# Patient Record
Sex: FEMALE | Race: WHITE | NOT HISPANIC OR LATINO | Employment: OTHER | URBAN - METROPOLITAN AREA
[De-identification: names, ages, dates, MRNs, and addresses within clinical notes are randomized per-mention and may not be internally consistent; named-entity substitution may affect disease eponyms.]

---

## 2017-05-03 ENCOUNTER — ALLSCRIPTS OFFICE VISIT (OUTPATIENT)
Dept: OTHER | Facility: OTHER | Age: 77
End: 2017-05-03

## 2017-10-17 ENCOUNTER — ALLSCRIPTS OFFICE VISIT (OUTPATIENT)
Dept: OTHER | Facility: OTHER | Age: 77
End: 2017-10-17

## 2017-10-18 NOTE — PROGRESS NOTES
Assessment  Assessed    1  AF (paroxysmal atrial fibrillation) (427 31) (I48 0)   2  Chronic anticoagulation (V58 61) (Z79 01)   3  Hyperlipidemia (272 4) (E78 5)   4  Preoperative cardiovascular examination (V72 81) (Z01 810)    Discussion/Summary  Cardiology Discussion Summary Free Text Note Form [facility]:   Patient overall doing reasonably well from cardiovascular standpoint  Patient has no specific contraindication from cardiovascular standpoint further urological procedure  Patient presents moderate risk based on age and comorbidities  Importance of anticoagulation to prevent thromboembolic episodes discussed with the patient  Patient also understands long interruption of anticoagulation will put her at risk for thromboembolic events  Patient has been instructed to let us know if there is significant interruption of anticoagulation  Patient report any bleeding issues  An note for preoperative cardiovascular evaluation for urological procedure faxed to urology office Dr Peterson  Follow-up in 6 months follow-up with primary care physician  Goals and Barriers: The patient has the current Goals: Compliance with anticoagulation  The patent has the current Barriers: None  Patient's Capacity to Self-Care: Patient is able to Self-Care  Patient Education: Educational resources provided: Please see discussion summary  Medication SE Review and Pt Understands Tx: Possible side effects of new medications were reviewed with the patient/guardian today  Counseling Documentation With Imm: The patient was counseled regarding risk factor reductions,-- impressions,-- risks and benefits of treatment options,-- importance of compliance with treatment  Chief Complaint  Chief Complaint Chronic Condition [facility]: Patient is here today for follow up of chronic conditions described in HPI        History of Present Illness  Cardiology HPI Free Text Note Form [facility]: Patient presents for preoperative cardiovascular evaluation for urological procedure  Patient had hematuria and kidney stones  Patient had a stent placed  Patient is supposed to have this removed later this week  Patient at some time was off anticoagulation for 2 weeks  Patient is back on anticoagulation except holding it for procedure later this week  Patient denies any cat hematuria  Patient denies any other bleeding issues  Patient denies any chest pain or shortness of breath out of the ordinary  She states that she has been compliant with all her present medications  Review of Systems  Cardiology Female ROS:     Cardiac: as noted in HPI  Skin: No complaints of nonhealing sores or skin rash  Genitourinary: as noted in HPI   Psychological: No complaints of feeling depressed, anxiety, panic attacks, or difficulty concentrating  General: No complaints of trouble sleeping, lack of energy, fatigue, appetite changes, weight changes, fever, frequent infections, or night sweats  Respiratory: No complaints of shortness of breath, cough with sputum, or wheezing  HEENT: No complaints of serious problems, hearing problems, nose problems, throat problems, or snoring  Gastrointestinal: No complaints of liver problems, nausea, vomiting, heartburn, constipation, bloody stools, diarrhea, problems swallowing, adbominal pain, or rectal bleeding  Hematologic: No complaints of bleeding disorders, anemia, blood clots, or excessive brusing  Neurological: No complaints of numbness, tingling, dizziness, weakness, seizures, headaches, syncope or fainting, AM fatigue, daytime sleepiness, no witnessed apnea episodes  Musculoskeletal: No complaints of arthritis, back pain, or painfull swelling  ROS Reviewed:   ROS reviewed  Active Problems  Problems    1  AF (paroxysmal atrial fibrillation) (427 31) (I48 0)   2  A-fib (427 31) (I48 91)   3  Chronic anticoagulation (V58 61) (Z79 01)   4  Diabetes mellitus (250 00) (E11 9)   5  Dyspnea (786 09) (R06 00)   6   History of hyperlipidemia (V12 29) (Z86 39)   7  Hyperlipidemia (272 4) (E78 5)    Past Medical History  Problems    1  History of Anxiety (300 00) (F41 9)   2  History of atrial fibrillation (V12 59) (Z86 79)   3  History of esophageal reflux (V12 79) (Z87 19)   4  History of hyperlipidemia (V12 29) (Z86 39)   5  History of renal calculi (V13 01) (G65 338)  Active Problems And Past Medical History Reviewed: The active problems and past medical history were reviewed and updated today  Surgical History  Problems    1  History of Breast Surgery Lumpectomy   2  History of Oophorectomy   3  History of Tonsillectomy  Surgical History Reviewed: The surgical history was reviewed and updated today  Family History  Mother    1  Family history of coronary artery disease (V17 3) (Z82 49)  Father    2  Family history of coronary artery disease (V17 3) (Z82 49)  Brother    3  Family history of Diabetes (250 00) (E11 9)   4  Family history of coronary artery disease (V17 3) (Z82 49)  Family History Reviewed: The family history was reviewed and updated today  Social History  Problems    · Former smoker  Social History Reviewed: The social history was reviewed and updated today  Current Meds   1  Apriso 0 375 GM Oral Capsule Extended Release 24 Hour; TAKE 4 CAPSULES DAILY IN   THE MORNING; Therapy: 22Apr2015 to (Evaluate:16Apr2016); Last Rx:22Apr2015 Ordered   2  Cetirizine HCl - 10 MG Oral Tablet; TAKE 1 TABLET AT BEDTIME; Therapy: 22Apr2015 to (Evaluate:22May2015); Last Rx:22Apr2015 Ordered   3  [employer] Vitamin D 2000 UNIT CAPS; take 1 capsule daily; Therapy: 22Apr2015 to (Last Rx:22Apr2015) Ordered   4  Daily Multiple Vitamins Oral Tablet; TAKE 1 TABLET DAILY; Therapy: 22Apr2015 to (Evaluate:26Vft0793); Last Rx:22Apr2015 Ordered   5  MetFORMIN HCl - 500 MG Oral Tablet; TAKE 1 TABLET TWICE DAILY,  WITH MORNING   AND EVENING MEAL; Therapy: 22Apr2015 to (Evaluate:16Apr2016); Last Rx:95Bng1957 Ordered   6  Metoprolol Tartrate 50 MG Oral Tablet; TAKE 1 TABLET TWICE DAILY; Therapy: 53CER0114 to (Evaluate:32Bse8049) Recorded   7  Potassium Citrate ER 10 MEQ (1080 MG) Oral Tablet Extended Release; TAKE 1 TABLET   DAILY; Therapy: 65LRN5126 to Recorded   8  Simvastatin 20 MG Oral Tablet; TAKE 1 TABLET DAILY AT BEDTIME; Therapy: 54Xqy3831 to (Evaluate:10Fws1356); Last Rx:26Eja6159 Ordered   9  Vitamin C Oral Tablet Chewable; Take 1 tablet daily; Therapy: 05Jpi9736 to (Last Rx:22Apr2015) Ordered   10  Xarelto 20 MG Oral Tablet; take 1 tablet by mouth daily; Therapy: 18Phz2322 to (Evaluate:05Yoa4114)  Requested for: 38JVK4669; Last    HX:15XWH2623 Ordered   11  Zolpidem Tartrate 5 MG Oral Tablet; TAKE 1 TABLET AT  BEDTIME AS NEEDED FOR    INSOMNIA; Therapy: 80Ypk8518 to (Evaluate:07Zpi5380); Last Rx:22Apr2015 Ordered  Medication List Reviewed: The medication list was reviewed and updated today  Allergies  Medication    1  Lipitor TABS   2  mirtazapine   3  Penicillins   4  sertraline   5  Sulfa Drugs   6  Symbicort AERO    Vitals  Vital Signs    Recorded: 58TER6796 09:41AM   Heart Rate 68   Systolic 120   Diastolic 74   Height 5 ft 2 in   Weight 145 lb    BMI Calculated 26 52   BSA Calculated 1 67   O2 Saturation 96     Physical Exam    Constitutional   General appearance: No acute distress, well appearing and well nourished  Eyes   Conjunctiva and Sclera examination: Conjunctiva pink, sclera anicteric  Ears, Nose, Mouth, and Throat - Oropharynx: Clear, nares are clear, mucous membranes are moist    Neck   Neck and thyroid: Normal, supple, trachea midline, no thyromegaly  Pulmonary   Respiratory effort: No increased work of breathing or signs of respiratory distress  Auscultation of lungs: Clear to auscultation, no rales, no rhonchi, no wheezing, good air movement  Cardiovascular   Auscultation of heart: Normal rate and rhythm, normal S1 and S2, no murmurs      Carotid pulses: Normal, 2+ bilaterally  Peripheral vascular exam: Normal pulses throughout, no tenderness, erythema or swelling  Pedal pulses: Normal, 2+ bilaterally  Examination of extremities for edema and/or varicosities: Normal     Abdomen   Abdomen: Non-tender and no distention  Liver and spleen: No hepatomegaly or splenomegaly  Musculoskeletal Gait and station: Normal gait  -- Digits and nails: Normal without clubbing or cyanosis  -- Inspection/palpation of joints, bones, and muscles: Normal, ROM normal     Skin - Skin and subcutaneous tissue: Normal without rashes or lesions  Skin is warm and well perfused, normal turgor  Neurologic - Cranial nerves: II - XII intact  -- Speech: Normal     Psychiatric - Orientation to person, place, and time: Normal -- Mood and affect: Normal       Signatures   Electronically signed by : SYDNEE Monroe ; Oct 17 2017  6:52PM EST                       (Author)

## 2018-01-13 VITALS
HEART RATE: 66 BPM | SYSTOLIC BLOOD PRESSURE: 112 MMHG | WEIGHT: 154 LBS | BODY MASS INDEX: 28.34 KG/M2 | DIASTOLIC BLOOD PRESSURE: 66 MMHG | HEIGHT: 62 IN | OXYGEN SATURATION: 95 %

## 2018-01-14 VITALS
HEART RATE: 68 BPM | WEIGHT: 145 LBS | DIASTOLIC BLOOD PRESSURE: 74 MMHG | OXYGEN SATURATION: 96 % | BODY MASS INDEX: 26.68 KG/M2 | HEIGHT: 62 IN | SYSTOLIC BLOOD PRESSURE: 120 MMHG

## 2018-01-15 NOTE — MISCELLANEOUS
This is to state that this patient has no specific contraindication from cardiovascular standpoint to proceed with urological surgery  Patient is holding Xarelto for 4 days prior to the procedure  Patient presents  moderate risk based on age and comorbidities  Patient should restart anticoagulation as soon as possible after the procedure when okay from a urological standpoint  If you have any specific questions however, please do not hesitate to contact me in  person  Electronically signed by:Brandi SHARP    Oct 17 2017 10:04AM EST

## 2018-05-17 RX ORDER — METOPROLOL TARTRATE 50 MG/1
1 TABLET, FILM COATED ORAL 2 TIMES DAILY
COMMUNITY
Start: 2017-05-03

## 2018-05-17 RX ORDER — MESALAMINE 375 MG/1
4 CAPSULE, EXTENDED RELEASE ORAL DAILY
COMMUNITY
Start: 2015-04-22

## 2018-05-17 RX ORDER — ZOLPIDEM TARTRATE 5 MG/1
1 TABLET ORAL
COMMUNITY
Start: 2015-04-22

## 2018-05-17 RX ORDER — SIMVASTATIN 20 MG
1 TABLET ORAL
COMMUNITY
Start: 2015-04-22

## 2018-05-17 RX ORDER — CETIRIZINE HYDROCHLORIDE 10 MG/1
1 TABLET ORAL
COMMUNITY
Start: 2015-04-22

## 2018-05-17 RX ORDER — POTASSIUM CITRATE 10 MEQ/1
1 TABLET, EXTENDED RELEASE ORAL DAILY
COMMUNITY
Start: 2017-05-03

## 2018-05-21 ENCOUNTER — OFFICE VISIT (OUTPATIENT)
Dept: CARDIOLOGY CLINIC | Facility: CLINIC | Age: 78
End: 2018-05-21
Payer: MEDICARE

## 2018-05-21 VITALS
OXYGEN SATURATION: 95 % | WEIGHT: 146 LBS | HEIGHT: 62 IN | DIASTOLIC BLOOD PRESSURE: 64 MMHG | BODY MASS INDEX: 26.87 KG/M2 | HEART RATE: 65 BPM | SYSTOLIC BLOOD PRESSURE: 120 MMHG

## 2018-05-21 DIAGNOSIS — Z79.01 CHRONIC ANTICOAGULATION: ICD-10-CM

## 2018-05-21 DIAGNOSIS — E78.2 HYPERLIPEMIA, MIXED: ICD-10-CM

## 2018-05-21 DIAGNOSIS — I48.0 PAROXYSMAL A-FIB (HCC): Primary | ICD-10-CM

## 2018-05-21 DIAGNOSIS — I10 HYPERTENSION, ESSENTIAL: ICD-10-CM

## 2018-05-21 PROCEDURE — 99214 OFFICE O/P EST MOD 30 MIN: CPT | Performed by: INTERNAL MEDICINE

## 2018-05-21 RX ORDER — HYDROCHLOROTHIAZIDE 25 MG/1
25 TABLET ORAL DAILY
COMMUNITY

## 2018-05-21 NOTE — PROGRESS NOTES
[facility] [place]  [address]  [place] [facility]  [address]  Cardiology Follow Up    Maria M [facility]  1940  [id number]      [facility]  [facility]     2  Chronic anticoagulation     3  Hyperlipemia, mixed     4  Hypertension, essential         Chief Complaint   Patient presents with    Follow-up       Interval History:   Patient presents for follow-up visit  Patient denies any history of chest pain shortness of breath  Patient denies any history of leg edema or orthopnea PND  No history of presyncope syncope  Patient states compliance with the present list of medications  Patient denies any bleeding issues  Patient is on anticoagulation for paroxysmal atrial fibrillation  Patient had kidney stones which were treated last year  No further recurrence  Patient Active Problem List   Diagnosis    Paroxysmal A-fib (HCC)    Chronic anticoagulation    Hyperlipemia, mixed    Hypertension, essential     Past Medical History:   Diagnosis Date    [facility]     Anxiety     Esophageal reflux     Hyperlipidemia     Renal calculi      Social History     Social History    Marital status: Single     Spouse name: N/A    Number of children: N/A    Years of education: N/A     Occupational History    Not on file       Social History Main Topics    Smoking status: Former Smoker    Smokeless tobacco: Never Used    Alcohol use No    Drug use: No    Sexual activity: Not on file     Other Topics Concern    Not on file     Social History Narrative    No narrative on file      Family History   Problem Relation Age of Onset    Coronary artery disease Mother     Coronary artery disease Father     Coronary artery disease Brother     Diabetes Brother      Past Surgical History:   Procedure Laterality Date    BREAST LUMPECTOMY      TONSILLECTOMY      TOTAL ABDOMINAL HYSTERECTOMY W/ BILATERAL SALPINGOOPHORECTOMY         Current Outpatient Prescriptions:     Bioflavonoid Products (VITAMIN C) CHEW, Chew 1 tablet daily, Disp: , Rfl:     cetirizine (ZyrTEC) 10 mg tablet, Take 1 tablet by mouth, Disp: , Rfl:     Cholecalciferol ([employer] VITAMIN D) 2000 units CAPS, Take 1 capsule by mouth daily, Disp: , Rfl:     hydrochlorothiazide (HYDRODIURIL) 25 mg tablet, Take 25 mg by mouth 2 (two) times a day, Disp: , Rfl:     mesalamine (APRISO) 0 375 g 24 hr capsule, Take 4 capsules by mouth Daily, Disp: , Rfl:     metFORMIN (GLUCOPHAGE) 500 mg tablet, Take 1 tablet by mouth Twice daily, Disp: , Rfl:     metoprolol tartrate (LOPRESSOR) 50 mg tablet, Take 1 tablet by mouth 2 (two) times a day, Disp: , Rfl:     potassium citrate (UROCIT-K) 10 mEq, Take 1 tablet by mouth daily, Disp: , Rfl:     rivaroxaban (XARELTO) 20 mg tablet, Take 1 tablet by mouth daily, Disp: , Rfl:     simvastatin (ZOCOR) 20 mg tablet, Take 1 tablet by mouth, Disp: , Rfl:     sitaGLIPtin (JANUVIA) 50 mg tablet, Take 50 mg by mouth daily, Disp: , Rfl:     zolpidem (AMBIEN) 5 mg tablet, Take 1 tablet by mouth, Disp: , Rfl:   Allergies   Allergen Reactions    Atorvastatin     Hydromorphone      Other reaction(s): dizziness    Mirtazapine     Penicillins     Sertraline     Sulfa Antibiotics     Symbicort  [Budesonide-Formoterol Fumarate]        Labs:  No visits with results within 2 Month(s) from this visit  Latest known visit with results is:   No results found for any previous visit  Imaging: No results found      Review of Systems:  Review of Systems   REVIEW OF SYSTEMS:  Constitutional:  Denies fever or chills   Eyes:  Denies change in visual acuity   HENT:  Denies nasal congestion or sore throat   Respiratory:  Denies cough or shortness of breath   Cardiovascular:  Denies chest pain or edema   GI:  Denies abdominal pain, nausea, vomiting, bloody stools or diarrhea   :  Denies dysuria, frequency, difficulty in micturition and nocturia  Musculoskeletal:  Denies back pain or joint pain   Neurologic:  Denies headache, focal weakness or sensory changes   Endocrine:  Denies polyuria or polydipsia   Lymphatic:  Denies swollen glands   Psychiatric:  Denies depression or anxiety     Physical Exam:    /64 (BP Location: Right arm, Patient Position: Sitting, Cuff Size: Adult)   Pulse 65   Ht 5' 2" (1 575 m)   Wt 66 2 kg (146 lb)   SpO2 95%   BMI 26 70 kg/m²     Physical Exam   PHYSICAL EXAM:  General:  Patient is not in acute distress   Head: Normocephalic, Atraumatic  HEENT:  Both pupils normal-size atraumatic, normocephalic, nonicteric  Neck:  JVP not raised  Trachea central  No carotid bruit  Respiratory:  normal breath sounds no crackles  no rhonchi  Cardiovascular:  Regular rate and rhythm no S3 no murmurs  GI:  Abdomen soft nontender  No organomegaly  Lymphatic:  No cervical or inguinal lymphadenopathy  Neurologic:  Patient is awake alert, oriented   Grossly nonfocal    Discussion/Summary:  Patient with multiple medical problems who seems to be doing reasonably well from cardiac standpoint  Previous studies reviewed with patient  Medications reviewed and possible side effects discussed  concepts of cardiovascular disease , signs and symptoms of heart disease  Dietary and risk factor modification reinforced  All questions answered  Safety measures reviewed  Patient advised to report any problems prompting medical attention  Patient understands the risks and benefits of anticoagulation to prevent thromboembolic risk from paroxysmal atrial fibrillation  Patient report any bleeding issues  Patient will continue present medications which were reviewed with her  Importance of salt restriction reinforced  Patient had a few questions which were answered  Follow-up with primary care physician  Followup in 6 months

## 2018-11-12 ENCOUNTER — OFFICE VISIT (OUTPATIENT)
Dept: CARDIOLOGY CLINIC | Facility: CLINIC | Age: 78
End: 2018-11-12
Payer: MEDICARE

## 2018-11-12 VITALS
HEART RATE: 71 BPM | HEIGHT: 62 IN | WEIGHT: 137.2 LBS | DIASTOLIC BLOOD PRESSURE: 78 MMHG | OXYGEN SATURATION: 96 % | BODY MASS INDEX: 25.25 KG/M2 | SYSTOLIC BLOOD PRESSURE: 130 MMHG

## 2018-11-12 DIAGNOSIS — E78.2 HYPERLIPEMIA, MIXED: ICD-10-CM

## 2018-11-12 DIAGNOSIS — Z79.01 CHRONIC ANTICOAGULATION: ICD-10-CM

## 2018-11-12 DIAGNOSIS — I10 HYPERTENSION, ESSENTIAL: ICD-10-CM

## 2018-11-12 DIAGNOSIS — I48.0 PAROXYSMAL A-FIB (HCC): Primary | ICD-10-CM

## 2018-11-12 PROCEDURE — 99214 OFFICE O/P EST MOD 30 MIN: CPT | Performed by: INTERNAL MEDICINE

## 2018-11-12 RX ORDER — MULTIVITAMIN
1 CAPSULE ORAL DAILY
COMMUNITY

## 2018-11-12 NOTE — PROGRESS NOTES
[facility]  [address]  [place] [facility]  [address]  Cardiology Follow Up    Viji Murillo  1940  [id number]      1  [facility] ([facility]     2  Hypertension, essential     3  Chronic anticoagulation     4  Hyperlipemia, mixed         Chief Complaint   Patient presents with    Follow-up       Interval History:  Patient presents for follow-up visit  Patient denies any history of chest pain shortness of breath  Patient denies any history of leg edema or orthopnea PND  No history of presyncope syncope  Patient states compliance with the present list of medications  Patient denies any bleeding issues  Patient is on anticoagulation for atrial fibrillation  Patient Active Problem List   Diagnosis    Paroxysmal A-fib (HCC)    Chronic anticoagulation    Hyperlipemia, mixed    Hypertension, essential     Past Medical History:   Diagnosis Date    [facility] )     Anxiety     Esophageal reflux     Hyperlipidemia     Renal calculi      Social History     Social History    Marital status: Single     Spouse name: N/A    Number of children: N/A    Years of education: N/A     Occupational History    Not on file       Social History Main Topics    Smoking status: Former Smoker    Smokeless tobacco: Never Used    Alcohol use No    Drug use: No    Sexual activity: Not on file     Other Topics Concern    Not on file     Social History Narrative    No narrative on file      Family History   Problem Relation Age of Onset    Coronary artery disease Mother     Coronary artery disease Father     Coronary artery disease Brother     Diabetes Brother      Past Surgical History:   Procedure Laterality Date    BREAST LUMPECTOMY      TONSILLECTOMY      TOTAL ABDOMINAL HYSTERECTOMY W/ BILATERAL SALPINGOOPHORECTOMY         Current Outpatient Prescriptions:     Bioflavonoid Products (VITAMIN C) CHEW, Chew 1 tablet daily, Disp: , Rfl:     cetirizine (ZyrTEC) 10 mg tablet, Take 1 tablet by mouth, Disp: , Rfl:     Cholecalciferol ([employer] VITAMIN D) 2000 units CAPS, Take by mouth 1/2 tablet daily , Disp: , Rfl:     hydrochlorothiazide (HYDRODIURIL) 25 mg tablet, Take 25 mg by mouth daily  , Disp: , Rfl:     mesalamine (APRISO) 0 375 g 24 hr capsule, Take 4 capsules by mouth Daily, Disp: , Rfl:     metFORMIN (GLUCOPHAGE) 500 mg tablet, Take 1 tablet by mouth Twice daily, Disp: , Rfl:     metoprolol tartrate (LOPRESSOR) 50 mg tablet, Take 1 tablet by mouth 2 (two) times a day, Disp: , Rfl:     Multiple Vitamin (MULTIVITAMIN) capsule, Take 1 capsule by mouth daily, Disp: , Rfl:     potassium citrate (UROCIT-K) 10 mEq, Take 1 tablet by mouth daily, Disp: , Rfl:     rivaroxaban (XARELTO) 20 mg tablet, Take 1 tablet by mouth daily, Disp: , Rfl:     simvastatin (ZOCOR) 20 mg tablet, Take 1 tablet by mouth, Disp: , Rfl:     sitaGLIPtin (JANUVIA) 50 mg tablet, Take 50 mg by mouth daily, Disp: , Rfl:     zolpidem (AMBIEN) 5 mg tablet, Take 1 tablet by mouth daily at bedtime as needed  , Disp: , Rfl:   Allergies   Allergen Reactions    Atorvastatin     Hydromorphone      Other reaction(s): dizziness    Mirtazapine     Penicillins     Sertraline     Sulfa Antibiotics     Symbicort  [Budesonide-Formoterol Fumarate]        Labs:  No visits with results within 2 Month(s) from this visit  Latest known visit with results is:   No results found for any previous visit  Imaging: No results found      Review of Systems:  Review of Systems   REVIEW OF SYSTEMS:  Constitutional:  Denies fever or chills   Eyes:  Denies change in visual acuity   HENT:  Denies nasal congestion or sore throat   Respiratory:  Denies cough or shortness of breath   Cardiovascular:  Denies chest pain or edema   GI:  Denies abdominal pain, nausea, vomiting, bloody stools or diarrhea   :  Denies dysuria, frequency, difficulty in micturition and nocturia  Musculoskeletal:  Denies back pain or joint pain   Neurologic: Denies headache, focal weakness or sensory changes   Endocrine:  Denies polyuria or polydipsia   Lymphatic:  Denies swollen glands   Psychiatric:  Denies depression or anxiety     Physical Exam:    /78   Pulse 71   Ht 5' 2" (1 575 m)   Wt 62 2 kg (137 lb 3 2 oz)   SpO2 96%   BMI 25 09 kg/m²     Physical Exam   PHYSICAL EXAM:  General:  Patient is not in acute distress   Head: Normocephalic, Atraumatic  HEENT:  Both pupils normal-size atraumatic, normocephalic, nonicteric  Neck:  JVP not raised  Trachea central  No carotid bruit  Respiratory:  normal breath sounds no crackles  no rhonchi  Cardiovascular:  Regular rate and rhythm no S3 no murmurs  GI:  Abdomen soft nontender  No organomegaly  Lymphatic:  No cervical or inguinal lymphadenopathy  Neurologic:  Patient is awake alert, oriented   Grossly nonfocal    Discussion/Summary:  Patient with multiple medical problems who seems to be doing reasonably well from cardiac standpoint  Previous studies reviewed with patient  Medications reviewed and possible side effects discussed  concepts of cardiovascular disease , signs and symptoms of heart disease  Dietary and risk factor modification reinforced  All questions answered  Safety measures reviewed  Patient advised to report any problems prompting medical attention  Patient understands the risks and benefits of anticoagulation to prevent thromboembolic risk from atrial fibrillation  Patient report any bleeding issues  Blood pressures are well controlled  Importance of salt restriction reinforced with the patient  Medications were reviewed  Follow-up with primary care physician  Echocardiogram will be done to evaluate ejection fraction valves and look for evidence of pulmonary hypertension  Patient has not had an echocardiogram in a few years  Follow-up in 6 months or earlier as needed

## 2018-12-10 ENCOUNTER — HOSPITAL ENCOUNTER (OUTPATIENT)
Dept: NON INVASIVE DIAGNOSTICS | Facility: CLINIC | Age: 78
Discharge: HOME | End: 2018-12-10
Payer: MEDICARE

## 2018-12-10 DIAGNOSIS — I48.0 PAROXYSMAL A-FIB (HCC): ICD-10-CM

## 2018-12-10 PROCEDURE — 93306 TTE W/DOPPLER COMPLETE: CPT | Performed by: INTERNAL MEDICINE

## 2018-12-10 PROCEDURE — 93306 TTE W/DOPPLER COMPLETE: CPT

## 2019-05-08 ENCOUNTER — OFFICE VISIT (OUTPATIENT)
Dept: CARDIOLOGY CLINIC | Facility: CLINIC | Age: 79
End: 2019-05-08
Payer: MEDICARE

## 2019-05-08 VITALS
WEIGHT: 133.4 LBS | HEIGHT: 62 IN | OXYGEN SATURATION: 97 % | SYSTOLIC BLOOD PRESSURE: 112 MMHG | DIASTOLIC BLOOD PRESSURE: 64 MMHG | HEART RATE: 66 BPM | BODY MASS INDEX: 24.55 KG/M2

## 2019-05-08 DIAGNOSIS — I10 HYPERTENSION, ESSENTIAL: ICD-10-CM

## 2019-05-08 DIAGNOSIS — E78.2 HYPERLIPEMIA, MIXED: ICD-10-CM

## 2019-05-08 DIAGNOSIS — Z79.01 CHRONIC ANTICOAGULATION: ICD-10-CM

## 2019-05-08 DIAGNOSIS — I48.0 PAROXYSMAL A-FIB (HCC): Primary | ICD-10-CM

## 2019-05-08 PROCEDURE — 99213 OFFICE O/P EST LOW 20 MIN: CPT | Performed by: INTERNAL MEDICINE

## 2023-10-03 ENCOUNTER — APPOINTMENT (EMERGENCY)
Dept: RADIOLOGY | Facility: HOSPITAL | Age: 83
End: 2023-10-03
Payer: MEDICARE

## 2023-10-03 ENCOUNTER — APPOINTMENT (EMERGENCY)
Dept: CT IMAGING | Facility: HOSPITAL | Age: 83
End: 2023-10-03
Payer: MEDICARE

## 2023-10-03 ENCOUNTER — HOSPITAL ENCOUNTER (EMERGENCY)
Facility: HOSPITAL | Age: 83
Discharge: HOME | End: 2023-10-03
Attending: EMERGENCY MEDICINE
Payer: MEDICARE

## 2023-10-03 VITALS
OXYGEN SATURATION: 96 % | RESPIRATION RATE: 19 BRPM | DIASTOLIC BLOOD PRESSURE: 60 MMHG | HEART RATE: 63 BPM | SYSTOLIC BLOOD PRESSURE: 127 MMHG | TEMPERATURE: 98 F

## 2023-10-03 DIAGNOSIS — R93.89 ABNORMAL CT SCAN: ICD-10-CM

## 2023-10-03 DIAGNOSIS — R51.9 HEADACHE: ICD-10-CM

## 2023-10-03 DIAGNOSIS — R42 DIZZINESS: Primary | ICD-10-CM

## 2023-10-03 LAB
ALBUMIN SERPL BCP-MCNC: 3.8 G/DL (ref 3.5–5)
ALP SERPL-CCNC: 50 U/L (ref 34–104)
ALT SERPL W P-5'-P-CCNC: 13 U/L (ref 7–52)
ANION GAP SERPL CALCULATED.3IONS-SCNC: 5 MMOL/L
APTT PPP: 32 SECONDS (ref 23–37)
AST SERPL W P-5'-P-CCNC: 15 U/L (ref 13–39)
ATRIAL RATE: 56 BPM
BACTERIA UR QL AUTO: ABNORMAL /HPF
BASOPHILS # BLD AUTO: 0.03 THOUSANDS/ÂΜL (ref 0–0.1)
BASOPHILS NFR BLD AUTO: 1 % (ref 0–1)
BILIRUB DIRECT SERPL-MCNC: 0.1 MG/DL (ref 0–0.2)
BILIRUB SERPL-MCNC: 0.41 MG/DL (ref 0.2–1)
BILIRUB UR QL STRIP: NEGATIVE
BUDDING YEAST: PRESENT
BUN SERPL-MCNC: 21 MG/DL (ref 5–25)
CALCIUM SERPL-MCNC: 10.5 MG/DL (ref 8.4–10.2)
CARDIAC TROPONIN I PNL SERPL HS: 2 NG/L
CHLORIDE SERPL-SCNC: 104 MMOL/L (ref 96–108)
CLARITY UR: ABNORMAL
CO2 SERPL-SCNC: 30 MMOL/L (ref 21–32)
COLOR UR: YELLOW
CREAT SERPL-MCNC: 1.02 MG/DL (ref 0.6–1.3)
EOSINOPHIL # BLD AUTO: 0.06 THOUSAND/ÂΜL (ref 0–0.61)
EOSINOPHIL NFR BLD AUTO: 1 % (ref 0–6)
ERYTHROCYTE [DISTWIDTH] IN BLOOD BY AUTOMATED COUNT: 12.7 % (ref 11.6–15.1)
GFR SERPL CREATININE-BSD FRML MDRD: 50 ML/MIN/1.73SQ M
GLUCOSE SERPL-MCNC: 79 MG/DL (ref 65–140)
GLUCOSE UR STRIP-MCNC: NEGATIVE MG/DL
HCT VFR BLD AUTO: 38.8 % (ref 34.8–46.1)
HGB BLD-MCNC: 12.5 G/DL (ref 11.5–15.4)
HGB UR QL STRIP.AUTO: NEGATIVE
IMM GRANULOCYTES # BLD AUTO: 0.02 THOUSAND/UL (ref 0–0.2)
IMM GRANULOCYTES NFR BLD AUTO: 0 % (ref 0–2)
INR PPP: 1.08 (ref 0.84–1.19)
KETONES UR STRIP-MCNC: NEGATIVE MG/DL
LACTATE SERPL-SCNC: 1.5 MMOL/L (ref 0.5–2)
LEUKOCYTE ESTERASE UR QL STRIP: ABNORMAL
LYMPHOCYTES # BLD AUTO: 1.92 THOUSANDS/ÂΜL (ref 0.6–4.47)
LYMPHOCYTES NFR BLD AUTO: 31 % (ref 14–44)
MCH RBC QN AUTO: 28.2 PG (ref 26.8–34.3)
MCHC RBC AUTO-ENTMCNC: 32.2 G/DL (ref 31.4–37.4)
MCV RBC AUTO: 88 FL (ref 82–98)
MONOCYTES # BLD AUTO: 0.49 THOUSAND/ÂΜL (ref 0.17–1.22)
MONOCYTES NFR BLD AUTO: 8 % (ref 4–12)
NEUTROPHILS # BLD AUTO: 3.6 THOUSANDS/ÂΜL (ref 1.85–7.62)
NEUTS SEG NFR BLD AUTO: 59 % (ref 43–75)
NITRITE UR QL STRIP: NEGATIVE
NON-SQ EPI CELLS URNS QL MICRO: ABNORMAL /HPF
NRBC BLD AUTO-RTO: 0 /100 WBCS
P AXIS: 86 DEGREES
PH UR STRIP.AUTO: 7.5 [PH]
PLATELET # BLD AUTO: 166 THOUSANDS/UL (ref 149–390)
PMV BLD AUTO: 10 FL (ref 8.9–12.7)
POTASSIUM SERPL-SCNC: 4.3 MMOL/L (ref 3.5–5.3)
PR INTERVAL: 220 MS
PROT SERPL-MCNC: 5.9 G/DL (ref 6.4–8.4)
PROT UR STRIP-MCNC: NEGATIVE MG/DL
PROTHROMBIN TIME: 14.6 SECONDS (ref 11.6–14.5)
QRS AXIS: 19 DEGREES
QRSD INTERVAL: 82 MS
QT INTERVAL: 398 MS
QTC INTERVAL: 384 MS
RBC # BLD AUTO: 4.43 MILLION/UL (ref 3.81–5.12)
RBC #/AREA URNS AUTO: ABNORMAL /HPF
SODIUM SERPL-SCNC: 139 MMOL/L (ref 135–147)
SP GR UR STRIP.AUTO: 1.02 (ref 1–1.03)
T WAVE AXIS: 28 DEGREES
UROBILINOGEN UR STRIP-ACNC: <2 MG/DL
VENTRICULAR RATE: 56 BPM
WBC # BLD AUTO: 6.12 THOUSAND/UL (ref 4.31–10.16)
WBC #/AREA URNS AUTO: ABNORMAL /HPF

## 2023-10-03 PROCEDURE — 84484 ASSAY OF TROPONIN QUANT: CPT | Performed by: EMERGENCY MEDICINE

## 2023-10-03 PROCEDURE — 70450 CT HEAD/BRAIN W/O DYE: CPT

## 2023-10-03 PROCEDURE — 99285 EMERGENCY DEPT VISIT HI MDM: CPT | Performed by: EMERGENCY MEDICINE

## 2023-10-03 PROCEDURE — 85730 THROMBOPLASTIN TIME PARTIAL: CPT | Performed by: EMERGENCY MEDICINE

## 2023-10-03 PROCEDURE — 93010 ELECTROCARDIOGRAM REPORT: CPT | Performed by: INTERNAL MEDICINE

## 2023-10-03 PROCEDURE — 93005 ELECTROCARDIOGRAM TRACING: CPT

## 2023-10-03 PROCEDURE — 36415 COLL VENOUS BLD VENIPUNCTURE: CPT | Performed by: EMERGENCY MEDICINE

## 2023-10-03 PROCEDURE — 80048 BASIC METABOLIC PNL TOTAL CA: CPT | Performed by: EMERGENCY MEDICINE

## 2023-10-03 PROCEDURE — 99284 EMERGENCY DEPT VISIT MOD MDM: CPT

## 2023-10-03 PROCEDURE — 87040 BLOOD CULTURE FOR BACTERIA: CPT | Performed by: EMERGENCY MEDICINE

## 2023-10-03 PROCEDURE — 85025 COMPLETE CBC W/AUTO DIFF WBC: CPT | Performed by: EMERGENCY MEDICINE

## 2023-10-03 PROCEDURE — 81001 URINALYSIS AUTO W/SCOPE: CPT | Performed by: EMERGENCY MEDICINE

## 2023-10-03 PROCEDURE — 85610 PROTHROMBIN TIME: CPT | Performed by: EMERGENCY MEDICINE

## 2023-10-03 PROCEDURE — 80076 HEPATIC FUNCTION PANEL: CPT | Performed by: EMERGENCY MEDICINE

## 2023-10-03 PROCEDURE — 96360 HYDRATION IV INFUSION INIT: CPT

## 2023-10-03 PROCEDURE — 83605 ASSAY OF LACTIC ACID: CPT | Performed by: EMERGENCY MEDICINE

## 2023-10-03 PROCEDURE — 71046 X-RAY EXAM CHEST 2 VIEWS: CPT

## 2023-10-03 RX ORDER — TRAZODONE HYDROCHLORIDE 50 MG/1
50 TABLET ORAL
COMMUNITY

## 2023-10-03 RX ORDER — MULTIVIT WITH MINERALS/LUTEIN
500 TABLET ORAL DAILY
COMMUNITY

## 2023-10-03 RX ADMIN — SODIUM CHLORIDE 1000 ML: 0.9 INJECTION, SOLUTION INTRAVENOUS at 14:34

## 2023-10-07 NOTE — ED PROVIDER NOTES
History  Chief Complaint   Patient presents with   • Dizziness     C/o of dizziness, HA , and fatigue that lasted x10 minutes earlier today and has now resolved. Recent episode of same x1 week ago. A&O x4. GCS 15.     77 yo female with a 10 minute episode of dizziness, headache and generalized weakness that happened earlier today. Currently asymptomatic. Had a similar episode one week ago. Prior to Admission Medications   Prescriptions Last Dose Informant Patient Reported? Taking?    Bioflavonoid Products (VITAMIN C) CHEW  Self Yes No   Sig: Chew 1 tablet daily   Cholecalciferol ([employer] VITAMIN D) 2000 units CAPS  Self Yes Yes   Sig: Take by mouth 1/2 tablet daily    Multiple Vitamin (MULTIVITAMIN) capsule  Self Yes Yes   Sig: Take 1 capsule by mouth daily   ascorbic acid (VITAMIN C) 250 mg tablet   Yes Yes   Sig: Take 500 mg by mouth daily   cetirizine (ZyrTEC) 10 mg tablet Not Taking Self Yes No   Sig: Take 1 tablet by mouth   Patient not taking: Reported on 10/3/2023   hydrochlorothiazide (HYDRODIURIL) 25 mg tablet  Self Yes No   Sig: Take 25 mg by mouth daily     mesalamine (APRISO) 0.375 g 24 hr capsule  Self Yes Yes   Sig: Take 4 capsules by mouth Daily   metFORMIN (GLUCOPHAGE) 500 mg tablet  Self Yes Yes   Sig: Take 1 tablet by mouth Twice daily   metoprolol tartrate (LOPRESSOR) 50 mg tablet  Self Yes Yes   Sig: Take 1 tablet by mouth 2 (two) times a day   potassium citrate (UROCIT-K) 10 mEq  Self Yes Yes   Sig: Take 1 tablet by mouth daily   rivaroxaban (XARELTO) 20 mg tablet  Self Yes Yes   Sig: Take 1 tablet by mouth daily   simvastatin (ZOCOR) 20 mg tablet  Self Yes Yes   Sig: Take 1 tablet by mouth   sitaGLIPtin (JANUVIA) 50 mg tablet  Self Yes Yes   Sig: Take 50 mg by mouth daily   traZODone (DESYREL) 50 mg tablet   Yes Yes   Sig: Take 50 mg by mouth daily at bedtime   zolpidem (AMBIEN) 5 mg tablet  Self Yes Yes   Sig: Take 1 tablet by mouth daily at bedtime as needed        Facility-Administered Medications: None       Past Medical History:   Diagnosis Date   • A-fib ([address])    • Anxiety    • Esophageal reflux    • Hyperlipidemia    • Renal calculi        Past Surgical History:   Procedure Laterality Date   • BREAST LUMPECTOMY     • TONSILLECTOMY     • TOTAL ABDOMINAL HYSTERECTOMY W/ BILATERAL SALPINGOOPHORECTOMY         Family History   Problem Relation Age of Onset   • Coronary artery disease Mother    • Coronary artery disease Father    • Coronary artery disease Brother    • Diabetes Brother      I have reviewed and agree with the history as documented. E-Cigarette/Vaping     E-Cigarette/Vaping Substances     Social History     Tobacco Use   • Smoking status: Former   • Smokeless tobacco: Never   Substance Use Topics   • Alcohol use: No   • Drug use: No       Review of Systems   Constitutional: Positive for fatigue. Neurological: Positive for dizziness and headaches. All other systems reviewed and are negative. Physical Exam  Physical Exam  Vitals and nursing note reviewed. Constitutional:       General: She is not in acute distress. Appearance: Normal appearance. She is well-developed. She is not ill-appearing, toxic-appearing or diaphoretic. HENT:      Head: Normocephalic and atraumatic. Eyes:      Conjunctiva/sclera: Conjunctivae normal.      Pupils: Pupils are equal, round, and reactive to light. Neck:      Vascular: No JVD. Cardiovascular:      Rate and Rhythm: Normal rate and regular rhythm. Pulses: Normal pulses. Heart sounds: Normal heart sounds. Pulmonary:      Effort: Pulmonary effort is normal. No respiratory distress. Breath sounds: Normal breath sounds. No stridor. Abdominal:      General: There is no distension. Palpations: Abdomen is soft. Tenderness: There is no abdominal tenderness. There is no guarding or rebound. Musculoskeletal:         General: No tenderness or deformity. Normal range of motion.       Cervical back: Normal range of motion and neck supple. No rigidity. Skin:     General: Skin is warm and dry. Capillary Refill: Capillary refill takes less than 2 seconds. Coloration: Skin is not pale. Findings: No erythema or rash. Neurological:      General: No focal deficit present. Mental Status: She is alert and oriented to person, place, and time. Cranial Nerves: No cranial nerve deficit. Sensory: No sensory deficit. Motor: No weakness or abnormal muscle tone. Coordination: Coordination normal.         Vital Signs  ED Triage Vitals   Temperature Pulse Respirations Blood Pressure SpO2   10/03/23 1358 10/03/23 1358 10/03/23 1358 10/03/23 1358 10/03/23 1358   98 °F (36.7 °C) 63 19 127/60 96 %      Temp Source Heart Rate Source Patient Position - Orthostatic VS BP Location FiO2 (%)   10/03/23 1358 10/03/23 1358 10/03/23 1358 10/03/23 1358 --   Oral Monitor Sitting Left arm       Pain Score       10/03/23 1400       2           Vitals:    10/03/23 1358   BP: 127/60   Pulse: 63   Patient Position - Orthostatic VS: Sitting         Visual Acuity  Visual Acuity    Flowsheet Row Most Recent Value   L Pupil Size (mm) 3   R Pupil Size (mm) 3          ED Medications  Medications   sodium chloride 0.9 % bolus 1,000 mL (0 mL Intravenous Stopped 10/3/23 1534)       Diagnostic Studies  Results Reviewed     Procedure Component Value Units Date/Time    Blood culture #1 [[id number]] Collected: 10/03/23 1430    Lab Status: Preliminary result Specimen: Blood from Arm, Right Updated: 10/06/23 1801     Blood Culture No Growth at 72 hrs. Blood culture #2 [[id number]] Collected: 10/03/23 1424    Lab Status: Preliminary result Specimen: Blood from Arm, Right Updated: 10/06/23 1801     Blood Culture No Growth at 72 hrs.     Lactic acid, plasma (w/reflex if result > 2.0) [[id number]]  (Normal) Collected: 10/03/23 1424    Lab Status: Final result Specimen: Blood from Arm, Right Updated: 10/03/23 1515     LACTIC ACID 1.5 mmol/L     Narrative:      Result may be elevated if tourniquet was used during collection.     HS Troponin 0hr (reflex protocol) [[id number]]  (Normal) Collected: 10/03/23 1424    Lab Status: Final result Specimen: Blood from Arm, Right Updated: 10/03/23 1508     hs TnI 0hr 2 ng/L     Basic metabolic panel [[id number]]  (Abnormal) Collected: 10/03/23 1424    Lab Status: Final result Specimen: Blood from Arm, Right Updated: 10/03/23 1500     Sodium 139 mmol/L      Potassium 4.3 mmol/L      Chloride 104 mmol/L      CO2 30 mmol/L      ANION GAP 5 mmol/L      BUN 21 mg/dL      Creatinine 1.02 mg/dL      Glucose 79 mg/dL      Calcium 10.5 mg/dL      eGFR 50 ml/min/1.73sq m     Narrative:      [facility] guidelines for [facility] Kid[facility] Disease (CKD):   •  Stage 1 with normal or high GFR (GFR > 90 mL/min/1.73 square meters)  •  Stage 2 Mild CKD (GFR = 60-89 mL/min/1.73 square meters)  •  Stage 3A Moderate CKD (GFR = 45-59 mL/min/1.73 square meters)  •  Stage 3B Moderate CKD (GFR = 30-44 mL/min/1.73 square meters)  •  Stage 4 Severe CKD (GFR = 15-29 mL/min/1.73 square meters)  •  Stage 5 End Stage CKD (GFR <15 mL/min/1.73 square meters)  Note: GFR calculation is accurate only with a steady state creatinine    Hepatic function panel [[id number]]  (Abnormal) Collected: 10/03/23 1424    Lab Status: Final result Specimen: Blood from Arm, Right Updated: 10/03/23 1500     Total Bilirubin 0.41 mg/dL      Bilirubin, Direct 0.10 mg/dL      Alkaline Phosphatase 50 U/L      AST 15 U/L      ALT 13 U/L      Total Protein 5.9 g/dL      Albumin 3.8 g/dL     Protime-INR [[id number]]  (Abnormal) Collected: 10/03/23 1424    Lab Status: Final result Specimen: Blood from Arm, Right Updated: 10/03/23 1459     Protime 14.6 seconds      INR 1.08    APTT [[id number]]  (Normal) Collected: 10/03/23 1424    Lab Status: Final result Specimen: Blood from Arm, Right Updated: 10/03/23 1459     PTT 32 seconds     Urine Microscopic [[id number]]  (Abnormal) Collected: 10/03/23 1434    Lab Status: Final result Specimen: Urine, Clean Catch Updated: 10/03/23 1450     RBC, UA 1-2 /hpf      WBC, UA 2-4 /hpf      Epithelial Cells Occasional /hpf      Bacteria, UA None Seen /hpf      Budding Yeast Present    UA (URINE) with reflex to Scope [[id number]]  (Abnormal) Collected: 10/03/23 1434    Lab Status: Final result Specimen: Urine, Clean Catch Updated: 10/03/23 1446     Color, UA Yellow     Clarity, UA Turbid     Specific Gravity, UA 1.017     pH, UA 7.5     Leukocytes, UA Moderate     Nitrite, UA Negative     Protein, UA Negative mg/dl      Glucose, UA Negative mg/dl      Ketones, UA Negative mg/dl      Urobilinogen, UA <2.0 mg/dl      Bilirubin, UA Negative     Occult Blood, UA Negative    CBC and differential [[id number]] Collected: 10/03/23 1424    Lab Status: Final result Specimen: Blood from Arm, Right Updated: 10/03/23 1443     WBC 6.12 Thousand/uL      RBC 4.43 Million/uL      Hemoglobin 12.5 g/dL      Hematocrit 38.8 %      MCV 88 fL      MCH 28.2 pg      MCHC 32.2 g/dL      RDW 12.7 %      MPV 10.0 fL      Platelets 107 Thousands/uL      nRBC 0 /100 WBCs      Neutrophils Relative 59 %      Immat GRANS % 0 %      Lymphocytes Relative 31 %      Monocytes Relative 8 %      Eosinophils Relative 1 %      Basophils Relative 1 %      Neutrophils Absolute 3.60 Thousands/µL      Immature Grans Absolute 0.02 Thousand/uL      Lymphocytes Absolute 1.92 Thousands/µL      Monocytes Absolute 0.49 Thousand/µL      Eosinophils Absolute 0.06 Thousand/µL      Basophils Absolute 0.03 Thousands/µL                  CT head without contrast   Final Result by Casie Ortiz MD (10/03 1543)      No acute intracranial CT abnormality. Workstation performed: [id number]         XR chest 2 views   Final Result by Alla Buchanan MD (10/03 1607)      No acute cardiopulmonary disease. 5 mm nodule over the thoracic spine on the lateral view.  While this could be a benign bone island, a lung nodule cannot be excluded. Recommend further evaluation with a chest CT with no contrast if clinically warranted given the patient's age and    comorbidities. I notified Dr. Edgardo Carreno by secure text on 10/3/2023 4:06 PM. He responded immediately. Workstation performed: [id number]                    Procedures  Procedures         ED Course                               SBIRT 20yo+    Flowsheet Row Most Recent Value   Initial Alcohol Screen: US AUDIT-C     1. How often do you have a drink containing alcohol? 0 Filed at: 10/03/2023 1430   2. How many drinks containing alcohol do you have on a typical day you are drinking? 0 Filed at: 10/03/2023 1430   3b. FEMALE Any Age, or MALE 65+: How often do you have 4 or more drinks on one occassion? 0 Filed at: 10/03/2023 1430   Audit-C Score 0 Filed at: 10/03/2023 1430   RYAN: How many times in the past year have you. .. Used an illegal drug or used a prescription medication for non-medical reasons? Never Filed at: 10/03/2023 1430                    Medical Decision Making  Abnormal CT scan:     Details: discussed associated results with pt and family who express understanding  Dizziness: complicated acute illness or injury  Amount and/or Complexity of Data Reviewed  Labs: ordered. Radiology: ordered. Disposition  Final diagnoses:   Dizziness   Headache   Abnormal CT scan     Time reflects when diagnosis was documented in both MDM as applicable and the Disposition within this note     Time User Action Codes Description Comment    10/3/2023  5:01 PM Dong Celia Add [R42] Dizziness     10/3/2023  5:01 PM Dong Celia Add [R51.9] Headache     10/7/2023  3:02 PM Dong Celia Add [R93.89] Abnormal CT scan       ED Disposition     ED Disposition   Discharge    Condition   Stable    Date/Time   Tue Oct 3, 2023  5:01 PM    Comment   Kayla Nunes discharge to home/self care.                Follow-up Information Follow up With Specialties Details Why Contact Info Additional Information    [address] [place] [address] Emergency Department Emergency Medicine  If symptoms worsen [address] [facility] Emergency Department, [place], [place], [address]          Discharge Medication List as of 10/3/2023  5:01 PM      CONTINUE these medications which have NOT CHANGED    Details   ascorbic acid (VITAMIN C) 250 mg tablet Take 500 mg by mouth daily, Historical Med      Cholecalciferol ([employer] VITAMIN D) 2000 units CAPS Take by mouth 1/2 tablet daily , Starting Wed 4/22/2015, Historical Med      mesalamine (APRISO) 0.375 g 24 hr capsule Take 4 capsules by mouth Daily, Starting Wed 4/22/2015, Historical Med      metFORMIN (GLUCOPHAGE) 500 mg tablet Take 1 tablet by mouth Twice daily, Starting Wed 4/22/2015, Historical Med      metoprolol tartrate (LOPRESSOR) 50 mg tablet Take 1 tablet by mouth 2 (two) times a day, Starting Wed 5/3/2017, Historical Med      Multiple Vitamin (MULTIVITAMIN) capsule Take 1 capsule by mouth daily, Historical Med      potassium citrate (UROCIT-K) 10 mEq Take 1 tablet by mouth daily, Starting Wed 5/3/2017, Historical Med      rivaroxaban (XARELTO) 20 mg tablet Take 1 tablet by mouth daily, Starting Wed 4/22/2015, Historical Med      simvastatin (ZOCOR) 20 mg tablet Take 1 tablet by mouth, Starting Wed 4/22/2015, Historical Med      sitaGLIPtin (JANUVIA) 50 mg tablet Take 50 mg by mouth daily, Historical Med      traZODone (DESYREL) 50 mg tablet Take 50 mg by mouth daily at bedtime, Historical Med      zolpidem (AMBIEN) 5 mg tablet Take 1 tablet by mouth daily at bedtime as needed  , Starting Wed 4/22/2015, Historical Med      Bioflavonoid Products (VITAMIN C) CHEW Chew 1 tablet daily, Starting Wed 4/22/2015, Historical Med      cetirizine (ZyrTEC) 10 mg tablet Take 1 tablet by mouth, Starting Wed 4/22/2015, Historical Med      hydrochlorothiazide (HYDRODIURIL) 25 mg tablet Take 25 mg by mouth daily  , Historical Med             No discharge procedures on file.     PDMP Review     None          ED Provider  Electronically Signed by           Zafar Davalos MD  10/07/23 7921

## 2023-10-08 LAB
BACTERIA BLD CULT: NORMAL
BACTERIA BLD CULT: NORMAL